# Patient Record
Sex: FEMALE | Race: WHITE | NOT HISPANIC OR LATINO | Employment: STUDENT | URBAN - METROPOLITAN AREA
[De-identification: names, ages, dates, MRNs, and addresses within clinical notes are randomized per-mention and may not be internally consistent; named-entity substitution may affect disease eponyms.]

---

## 2023-09-21 NOTE — PROGRESS NOTES
"    New Patient Office Visit      Date: 2023   Patient Name: Neyda Talamantes  : 2005   MRN: [id number]     Chief Complaint:    Chief Complaint   Patient presents with    Hypoglycemia     TREMORS SOB        History of Present Illness: Neyda Talamantes is a 17 y.o. female who is here today to establish care.      Subjective      HPI:  Patient is a sophomore at [employer].  Presents today to establish care.  She was last established with a PCP 2 to 3 years ago.      Having \"episodes\" over the last two months where she will have sudden onset dizziness with tremors.  Denies any history of similar episodes prior to 2 months ago.  When first starting it was happening around 1-2x per week.have been gradually increasing in frequency, now 3-4 x per week. Episodes initially lasted only few minutes but can now last between 1-3 hours.  Describes symptoms as \" I get really dizzy and hot and then tremors will start.\"  Tremors are only in the hands.  Initially the episodes seemed to be related to meals, noticed around 30 minutes to an hour after eating.  Symptoms are usually relieved with eating a snack but she will feel tired for several minutes 10 hours after an episode.  Tries to carry snacks- something savory usually helps rather than sugar. Has some associated SOB with the episodes.  History of childhood asthma but no issues in recent years.  She has not had any associated syncope.  Some associated nausea but no vomiting.  No family history of type I or type 2 diabetes.  She does have a childhood history of iron deficiency anemia.  States that menstrual cycles are regular bleeding is described as normal not excessive.  Cycles usually last 5 days.  She denies any substance use.  Rare alcohol use once per week or less.  Denies any binge drinking.  States that she does struggle at times with anxiety/depression but the episodes do not seem to be stress-induced.    No other concerns noted today.    Review of " Systems:   Negative/not pertinent unless otherwise noted above in HPI.     Past Medical History:   Past Medical History:   Diagnosis Date    Asthma     Low iron        Past Surgical History:   Past Surgical History:   Procedure Laterality Date    THROAT SURGERY         Family History:   Family History   Problem Relation Age of Onset    Arthritis Mother     Sleep apnea Mother     Hypertension Father     High cholesterol Father     Sleep apnea Father     Breast cancer Maternal Grandmother     Breast cancer Paternal Grandmother        Social History:   Social History     Socioeconomic History    Marital status: Single   Tobacco Use    Smoking status: Never    Smokeless tobacco: Never   Substance and Sexual Activity    Alcohol use: Yes    Drug use: Never    Sexual activity: Defer       Medications:   No current outpatient medications on file.    Allergies:   No Known Allergies    Immunizations:  Immunization History   Administered Date(s) Administered    COVID-19 (PFIZER) Purple Cap Monovalent 04/08/2021, 05/12/2021, 01/14/2022       Tobacco Use: Low Risk     Smoking Tobacco Use: Never    Smokeless Tobacco Use: Never    Passive Exposure: Not on file       Social History     Substance and Sexual Activity   Alcohol Use Yes        Social History     Substance and Sexual Activity   Drug Use Never        Diet/Physical activity: Described as healthy, regular physical activity.  Likes yoga. counseled on 09/22/23    Sexual Health: using contraception, not attempting pregnancy   Menstrual Cycles: regular, last menstrual cycle: unknown    PHQ-2 Depression Screening  Little interest or pleasure in doing things? 1-->several days   Feeling down, depressed, or hopeless? 1-->several days   PHQ-2 Total Score 5     PHQ-9 Total Score: 5     Objective     Physical Exam:  Vital Signs:   Vitals:    09/22/23 0823   BP: 122/78   BP Location: Left arm   Patient Position: Sitting   Cuff Size: Adult   Pulse: 85   SpO2: 98%   Weight: 89.5 kg (197  "lb 6.4 oz)   Height: 167.6 cm (66\")     Body mass index is 31.86 kg/m².    Physical Exam  Constitutional:       Appearance: She is normal weight. She is not ill-appearing.   HENT:      Head: Normocephalic and atraumatic.      Right Ear: Tympanic membrane normal.      Left Ear: Tympanic membrane normal.      Mouth/Throat:      Mouth: Mucous membranes are moist.      Pharynx: Oropharynx is clear. No oropharyngeal exudate or posterior oropharyngeal erythema.   Eyes:      Extraocular Movements: Extraocular movements intact.      Conjunctiva/sclera: Conjunctivae normal.   Cardiovascular:      Rate and Rhythm: Normal rate and regular rhythm.      Heart sounds: Normal heart sounds.   Pulmonary:      Breath sounds: Normal breath sounds. No wheezing or rhonchi.   Abdominal:      General: Abdomen is flat.      Palpations: Abdomen is soft.      Tenderness: There is no abdominal tenderness.   Musculoskeletal:         General: Normal range of motion.      Cervical back: Normal range of motion and neck supple.   Lymphadenopathy:      Cervical: No cervical adenopathy.   Neurological:      General: No focal deficit present.      Mental Status: She is alert.   Psychiatric:         Mood and Affect: Mood normal.         Thought Content: Thought content normal.             Assessment / Plan      Assessment/Plan:   Diagnoses and all orders for this visit:    1. Encounter for medical examination to establish care (Primary)    2. Dizziness  -     POCT Glucose  -     Comprehensive Metabolic Panel; Future  -     CBC & Differential; Future  -     TSH Rfx On Abnormal To Free T4; Future  -     Cancel: Hemoglobin A1c; Future  -     POC Glycosylated Hemoglobin (Hb A1C)    3. Other fatigue  -     Comprehensive Metabolic Panel; Future  -     TSH Rfx On Abnormal To Free T4; Future    4. Occasional tremors  -     POCT Glucose  -     Comprehensive Metabolic Panel; Future  -     TSH Rfx On Abnormal To Free T4; Future  -     Cancel: Hemoglobin A1c; " Future  -     POC Glycosylated Hemoglobin (Hb A1C)      Episodes of Dizziness/Fatigue/Tremors  This is a new problem that has been occurring x2 months.  Differentials considered for episodes include vasovagal presyncope, hypoglycemia, anemia, thyroid imbalance or Psychosomatic.  All possibilities were discussed with patient.  Her POC A1c returned normal at 5.3% but her POC glucose was low at 49.  Patient denied any current symptoms of dizziness, fatigue, tremors during the time of her visit.  Venous draw was ordered for confirmation along with CBC, TSH to rule out other secondary causes.  If her blood work is unrevealing her symptoms seem most consistent with vasovagal episodes, although the low POC glucose is concerning for hypoglycemic events.  She was counseled to avoid fasting and dehydration in the meantime.  Keep a snack on you at all times.  Further work-up to be determined once her lab results are back.    -Address anxiety/depressive symptoms if still occurring at follow-up    Healthcare Maintenance:  Counseling provided based on age appropriate USPSTF guidelines.  BMI cannot be calculated due to outdated height or weight values.  Please input a current height/weight in Vitals and re-renter BMIFOLLOWUP in Note to pull in correct documentation based on BMI range.    Neyda Vinita voices understanding and acceptance of this advice and will call back with any further questions or concerns. AVS with preventive healthcare tips printed for patient.         Follow Up:   Return if symptoms worsen or fail to improve.        Mara Tucker DO  [facility] [address]

## 2023-09-22 ENCOUNTER — LAB (OUTPATIENT)
Dept: LAB | Facility: HOSPITAL | Age: 18
End: 2023-09-22
Payer: COMMERCIAL

## 2023-09-22 ENCOUNTER — OFFICE VISIT (OUTPATIENT)
Dept: FAMILY MEDICINE CLINIC | Facility: CLINIC | Age: 18
End: 2023-09-22
Payer: COMMERCIAL

## 2023-09-22 VITALS
OXYGEN SATURATION: 98 % | HEART RATE: 85 BPM | SYSTOLIC BLOOD PRESSURE: 122 MMHG | BODY MASS INDEX: 31.72 KG/M2 | HEIGHT: 66 IN | DIASTOLIC BLOOD PRESSURE: 78 MMHG | WEIGHT: 197.4 LBS

## 2023-09-22 DIAGNOSIS — R42 DIZZINESS: ICD-10-CM

## 2023-09-22 DIAGNOSIS — R53.83 OTHER FATIGUE: ICD-10-CM

## 2023-09-22 DIAGNOSIS — R25.1 OCCASIONAL TREMORS: ICD-10-CM

## 2023-09-22 DIAGNOSIS — Z00.00 ENCOUNTER FOR MEDICAL EXAMINATION TO ESTABLISH CARE: Primary | ICD-10-CM

## 2023-09-22 LAB
ALBUMIN SERPL-MCNC: 4.5 G/DL (ref 3.5–5.2)
ALBUMIN/GLOB SERPL: 1.6 G/DL
ALP SERPL-CCNC: 97 U/L (ref 39–117)
ALT SERPL W P-5'-P-CCNC: 9 U/L (ref 1–33)
ANION GAP SERPL CALCULATED.3IONS-SCNC: 11.4 MMOL/L (ref 5–15)
AST SERPL-CCNC: 13 U/L (ref 1–32)
BASOPHILS # BLD AUTO: 0.05 10*3/MM3 (ref 0–0.2)
BASOPHILS NFR BLD AUTO: 0.6 % (ref 0–1.5)
BILIRUB SERPL-MCNC: 0.3 MG/DL (ref 0–1.2)
BUN SERPL-MCNC: 7 MG/DL (ref 6–20)
BUN/CREAT SERPL: 11.3 (ref 7–25)
CALCIUM SPEC-SCNC: 9.1 MG/DL (ref 8.6–10.5)
CHLORIDE SERPL-SCNC: 105 MMOL/L (ref 98–107)
CO2 SERPL-SCNC: 25.6 MMOL/L (ref 22–29)
CREAT SERPL-MCNC: 0.62 MG/DL (ref 0.57–1)
DEPRECATED RDW RBC AUTO: 38.5 FL (ref 37–54)
EGFRCR SERPLBLD CKD-EPI 2021: 131.7 ML/MIN/1.73
EOSINOPHIL # BLD AUTO: 0.17 10*3/MM3 (ref 0–0.4)
EOSINOPHIL NFR BLD AUTO: 2.1 % (ref 0.3–6.2)
ERYTHROCYTE [DISTWIDTH] IN BLOOD BY AUTOMATED COUNT: 13 % (ref 12.3–15.4)
EXPIRATION DATE: NORMAL
GLOBULIN UR ELPH-MCNC: 2.8 GM/DL
GLUCOSE BLDC GLUCOMTR-MCNC: 49 MG/DL (ref 70–130)
GLUCOSE SERPL-MCNC: 82 MG/DL (ref 65–99)
HBA1C MFR BLD: 5.3 %
HCT VFR BLD AUTO: 40.1 % (ref 34–46.6)
HGB BLD-MCNC: 13.1 G/DL (ref 12–15.9)
IMM GRANULOCYTES # BLD AUTO: 0.02 10*3/MM3 (ref 0–0.05)
IMM GRANULOCYTES NFR BLD AUTO: 0.2 % (ref 0–0.5)
LYMPHOCYTES # BLD AUTO: 2.28 10*3/MM3 (ref 0.7–3.1)
LYMPHOCYTES NFR BLD AUTO: 28.4 % (ref 19.6–45.3)
Lab: NORMAL
MCH RBC QN AUTO: 27 PG (ref 26.6–33)
MCHC RBC AUTO-ENTMCNC: 32.7 G/DL (ref 31.5–35.7)
MCV RBC AUTO: 82.7 FL (ref 79–97)
MONOCYTES # BLD AUTO: 0.57 10*3/MM3 (ref 0.1–0.9)
MONOCYTES NFR BLD AUTO: 7.1 % (ref 5–12)
NEUTROPHILS NFR BLD AUTO: 4.95 10*3/MM3 (ref 1.7–7)
NEUTROPHILS NFR BLD AUTO: 61.6 % (ref 42.7–76)
NRBC BLD AUTO-RTO: 0 /100 WBC (ref 0–0.2)
PLATELET # BLD AUTO: 306 10*3/MM3 (ref 140–450)
PMV BLD AUTO: 11.1 FL (ref 6–12)
POTASSIUM SERPL-SCNC: 3.5 MMOL/L (ref 3.5–5.2)
PROT SERPL-MCNC: 7.3 G/DL (ref 6–8.5)
RBC # BLD AUTO: 4.85 10*6/MM3 (ref 3.77–5.28)
SODIUM SERPL-SCNC: 142 MMOL/L (ref 136–145)
TSH SERPL DL<=0.05 MIU/L-ACNC: 1.52 UIU/ML (ref 0.27–4.2)
WBC NRBC COR # BLD: 8.04 10*3/MM3 (ref 3.4–10.8)

## 2023-09-22 PROCEDURE — 80050 GENERAL HEALTH PANEL: CPT

## 2023-09-28 ENCOUNTER — PATIENT ROUNDING (BHMG ONLY) (OUTPATIENT)
Dept: FAMILY MEDICINE CLINIC | Facility: CLINIC | Age: 18
End: 2023-09-28
Payer: COMMERCIAL

## 2023-11-10 ENCOUNTER — OFFICE VISIT (OUTPATIENT)
Dept: FAMILY MEDICINE CLINIC | Facility: CLINIC | Age: 18
End: 2023-11-10
Payer: COMMERCIAL

## 2023-11-10 VITALS
SYSTOLIC BLOOD PRESSURE: 118 MMHG | HEIGHT: 66 IN | WEIGHT: 201.6 LBS | DIASTOLIC BLOOD PRESSURE: 70 MMHG | OXYGEN SATURATION: 97 % | BODY MASS INDEX: 32.4 KG/M2 | HEART RATE: 75 BPM

## 2023-11-10 DIAGNOSIS — Z30.011 ENCOUNTER FOR ORAL CONTRACEPTION INITIAL PRESCRIPTION: Primary | ICD-10-CM

## 2023-11-10 LAB
B-HCG UR QL: NEGATIVE
EXPIRATION DATE: NORMAL
INTERNAL NEGATIVE CONTROL: NEGATIVE
INTERNAL POSITIVE CONTROL: POSITIVE
Lab: NORMAL

## 2023-11-10 RX ORDER — NORETHINDRONE ACETATE AND ETHINYL ESTRADIOL 1MG-20(21)
1 KIT ORAL DAILY
Qty: 84 TABLET | Refills: 3 | Status: SHIPPED | OUTPATIENT
Start: 2023-11-10 | End: 2024-10-11

## 2023-11-10 NOTE — PROGRESS NOTES
Chief Complaint   Patient presents with    Med Refill     Discuss birth control       HPI:  Neyda Talamantes is a 17 y.o. female who presents today to discuss contraception.     Presents today to discuss birth control options. Recently became sexually active. Menstrual cycles are regular, now closer to every 28 days. Last usually 4 to 5 days, starts off very heavy the first couple of days and then will quickly lighten. Used to have a lot more PMS symptoms (cramping, mood changes) but these have also decreased. No h/o STIs. Denies any tobacco use. No h/o blood clots. No h/o uncontrolled migraines. October 17th [facility].      PE:  Vitals:    11/10/23 0940   BP: 118/70   Pulse: 75   SpO2: 97%      Body mass index is 32.54 kg/m².    Gen Appearance: NAD  HEENT: Normocephalic, EOMI  Lungs: Normal respiratory effort  MSK: Moves all extremities well, normal gait, no peripheral edema  Neuro: No focal deficits    Current Outpatient Medications   Medication Sig Dispense Refill    norethindrone-ethinyl estradiol FE (Loestrin Fe 1/20) 1-20 MG-MCG per tablet Take 1 tablet by mouth Daily for 336 days. 84 tablet 3     No current facility-administered medications for this visit.        A/P:  Diagnoses and all orders for this visit:    1. Encounter for oral contraception initial prescription (Primary)  -     norethindrone-ethinyl estradiol FE (Loestrin Fe 1/20) 1-20 MG-MCG per tablet; Take 1 tablet by mouth Daily for 336 days.  Dispense: 84 tablet; Refill: 3  -     POCT pregnancy, urine    Other orders  -     Fluzone >6 Months ([id number])       Reviewed options for birth control including ALEXA's, LARCs, patches, depo provera injections and natural forms of family planning. Patient would like to trial ALEXA's. No contraindications. Pregnancy test negative in office today. Counseled to use back up form of birth control for the first week. Counseled that ALEXA's do not prevent STI's and would need barrier contraception for prevention. If  experiencing lasting side effects patient will call/RTC.     Return in about 1 year (around 11/10/2024) for Annual.     Dictated Utilizing Dragon Dictation    Please note that portions of this note were completed with a voice recognition program.    Part of this note may be an electronic transcription/translation of spoken language to printed text using the Dragon Dictation System.

## 2024-06-11 ENCOUNTER — OFFICE VISIT (OUTPATIENT)
Dept: FAMILY MEDICINE CLINIC | Facility: CLINIC | Age: 19
End: 2024-06-11
Payer: COMMERCIAL

## 2024-06-11 VITALS
SYSTOLIC BLOOD PRESSURE: 110 MMHG | BODY MASS INDEX: 34.39 KG/M2 | OXYGEN SATURATION: 98 % | HEART RATE: 82 BPM | HEIGHT: 66 IN | DIASTOLIC BLOOD PRESSURE: 80 MMHG | WEIGHT: 214 LBS

## 2024-06-11 DIAGNOSIS — N93.0 PCB (POST COITAL BLEEDING): Primary | ICD-10-CM

## 2024-06-11 PROCEDURE — 81025 URINE PREGNANCY TEST: CPT | Performed by: STUDENT IN AN ORGANIZED HEALTH CARE EDUCATION/TRAINING PROGRAM

## 2024-06-11 PROCEDURE — 99213 OFFICE O/P EST LOW 20 MIN: CPT | Performed by: STUDENT IN AN ORGANIZED HEALTH CARE EDUCATION/TRAINING PROGRAM

## 2024-06-11 RX ORDER — NORETHINDRONE ACETATE AND ETHINYL ESTRADIOL .03; 1.5 MG/1; MG/1
1 TABLET ORAL DAILY
Qty: 84 EACH | Refills: 3 | Status: SHIPPED | OUTPATIENT
Start: 2024-06-11

## 2024-06-11 NOTE — PROGRESS NOTES
Chief Complaint   Patient presents with    Contraception       HPI:  Neyda Talamantes is a 18 y.o. female who presents today for abnormal uterine bleeding.     Pt was put on Lo-estrin OCPs a few months ago. Was doing well on therapy but over the last couple of months has had some post coital bleeding. Only sees her SO 1-2 times per month. Will typically have 3-4 days of light spotting afterwards. Denies any pain, abnornal discharge, vaginal irritation or dysuria. Has had same sexual partner and she has been his only sexual partner. No h/o STIs. Has not missed any OCP doses. She has had heavier periods although they are shorter in duration. She does get occasional headaches around time of her menses.     PE:  Vitals:    06/11/24 1114   BP: 110/80   Pulse: 82   SpO2: 98%      Body mass index is 34.54 kg/m².    Gen Appearance: NAD  HEENT: Normocephalic, EOMI  Lungs: Normal WOB  MSK: Moves all extremities well, normal gait, no peripheral edema  Neuro: No focal deficits    Current Outpatient Medications   Medication Sig Dispense Refill    Norethindrone Acet-Ethinyl Est 1.5-30 MG-MCG tablet Take 1 tablet by mouth Daily. 84 each 3     No current facility-administered medications for this visit.        A/P:  Diagnoses and all orders for this visit:    1. PCB (post coital bleeding) (Primary)  -     POC Pregnancy, Urine    Other orders  -     Norethindrone Acet-Ethinyl Est 1.5-30 MG-MCG tablet; Take 1 tablet by mouth Daily.  Dispense: 84 each; Refill: 3       UPT negative in office. Low risk for STI, testing deferred.   Likely related to OCP use- will trial slightly higher dose  FU if symptoms do not resolve or if experiencing adverse effects    Dictated Utilizing Dragon Dictation    Please note that portions of this note were completed with a voice recognition program.    Part of this note may be an electronic transcription/translation of spoken language to printed text using the Dragon Dictation System.

## 2024-06-28 ENCOUNTER — PATIENT MESSAGE (OUTPATIENT)
Dept: FAMILY MEDICINE CLINIC | Facility: CLINIC | Age: 19
End: 2024-06-28
Payer: COMMERCIAL

## 2024-06-28 DIAGNOSIS — Z30.011 ENCOUNTER FOR ORAL CONTRACEPTION INITIAL PRESCRIPTION: Primary | ICD-10-CM

## 2024-07-03 RX ORDER — NORGESTIMATE AND ETHINYL ESTRADIOL 7DAYSX3 28
1 KIT ORAL DAILY
Qty: 28 TABLET | Refills: 12 | Status: SHIPPED | OUTPATIENT
Start: 2024-07-03 | End: 2025-07-03

## 2024-07-03 NOTE — TELEPHONE ENCOUNTER
From: Neyda Talamantes  To: Mara Tucker  Sent: 6/28/2024 11:16 AM EDT  Subject: Symptoms From Changed Birth Control Dosage    Fer Terry! I’ve been having some odd instances of numbness and tingling since starting the new birth control. It’s always on my left side and is mostly concentrated to my hand, arm, and lips. Since my pack is almost done for this month, I’ll be starting my period tomorrow, but I would like to know what to do afterwards. Should I resume the other pack of the new prescription, or should I go back to using the remaining pack in my old prescription? Thanks!

## 2024-11-11 ENCOUNTER — OFFICE VISIT (OUTPATIENT)
Dept: FAMILY MEDICINE CLINIC | Facility: CLINIC | Age: 19
End: 2024-11-11
Payer: COMMERCIAL

## 2024-11-11 VITALS
DIASTOLIC BLOOD PRESSURE: 70 MMHG | HEART RATE: 104 BPM | WEIGHT: 219 LBS | BODY MASS INDEX: 35.2 KG/M2 | SYSTOLIC BLOOD PRESSURE: 122 MMHG | OXYGEN SATURATION: 99 % | HEIGHT: 66 IN

## 2024-11-11 DIAGNOSIS — M79.605 BILATERAL LEG AND FOOT PAIN: ICD-10-CM

## 2024-11-11 DIAGNOSIS — M79.671 BILATERAL LEG AND FOOT PAIN: ICD-10-CM

## 2024-11-11 DIAGNOSIS — Z00.00 ANNUAL PHYSICAL EXAM: Primary | ICD-10-CM

## 2024-11-11 DIAGNOSIS — M79.604 BILATERAL LEG AND FOOT PAIN: ICD-10-CM

## 2024-11-11 DIAGNOSIS — Z12.4 SCREENING FOR MALIGNANT NEOPLASM OF CERVIX: ICD-10-CM

## 2024-11-11 DIAGNOSIS — M79.672 BILATERAL LEG AND FOOT PAIN: ICD-10-CM

## 2024-11-11 DIAGNOSIS — Z30.09 GENERAL COUNSELING AND ADVICE ON CONTRACEPTIVE MANAGEMENT: ICD-10-CM

## 2024-11-11 PROCEDURE — 99395 PREV VISIT EST AGE 18-39: CPT | Performed by: STUDENT IN AN ORGANIZED HEALTH CARE EDUCATION/TRAINING PROGRAM

## 2024-11-11 NOTE — PROGRESS NOTES
Female Physical Note      Date: 2024   Patient Name: Neyda Talamantes  : 2005   MRN: [id number]     Chief Complaint:    Chief Complaint   Patient presents with    Annual Exam    Contraception     Patient wants to discuss long term birth control        History of Present Illness: Neyda Talamantes is a 18 y.o. female who is here today for their annual health maintenance and physical.    HPI  Doing well since last visit.  Would like to discuss contraceptive options.  Currently on triphasic OCPs.  We had switched around her OCPs a couple of times due to irregular bleeding.  Has done best on the triphasic formulation.  Would like to consider longer-term contraceptive option.  Has looked into Nexplanon and IUD options, would like to discuss further.  Having occasional migraines at end of cycle, these are manageable with OTC medications.     Occasionally her feet and legs will be sore at the end of the workday.  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  Wears Chance and other supportive shoes most of the time.    Getting ready to start her last semester of school.  Diet is described as healthy.  Eats fruits and vegetables every day.  Would like to exercise more.  Does get cardio in with walking to classes during school week.  Would like to incorporate more weightbearing activity.    Subjective      Review of Systems:   Review of Systems   Eyes:  Negative for visual disturbance.   Genitourinary:  Negative for menstrual problem.   Musculoskeletal:  Positive for arthralgias.   Neurological:  Positive for headache.       Past Medical History, Social History, Family History and Care Team were all reviewed with patient and updated as appropriate.     Medications:     Current Outpatient Medications:     norgestimate-ethinyl estradiol (ORTHO TRI-CYCLEN,TRINESSA) 0.18/0.215/0.25 MG-35 MCG per tablet, Take 1 tablet by mouth Daily., Disp: 28 tablet, Rfl: 12    Allergies:   No Known  "Allergies    Immunizations:  Td/Tdap(Booster Q 10 yrs): UTD 7/27/2015  Flu (Yearly):  Planning to get through [facility]  Immunization History   Administered Date(s) Administered    COVID-19 (PFIZER) Purple Cap Monovalent 04/08/2021, 05/12/2021, 01/14/2022    DTaP, Unspecified 02/24/2004, 04/22/2004, 06/21/2004, 06/23/2005, 04/17/2008    FluMist 2-49yrs 11/14/2014    Fluzone (or Fluarix & Flulaval for VFC) >6mos 11/10/2023    Hep A, 2 Dose 03/05/2018, 09/17/2018    Hep B, Adolescent or Pediatric 2005, 02/24/2004, 06/21/2004    Hib (PRP-T) 02/24/2004, 04/22/2004, 06/21/2004, 06/23/2005    IPV 02/24/2004, 04/22/2004, 06/21/2004, 04/17/2008    MMR 12/21/2004, 04/17/2008    Meningococcal B,(Bexsero) 07/29/2021    Meningococcal Conjugate 07/29/2015, 07/03/2020    PEDS-Pneumococcal Conjugate (PCV7) 02/24/2004, 04/24/2004, 09/30/2004, 12/21/2004    Tdap 07/27/2015    Varicella 12/21/2004, 07/29/2015     Ophthalmologist/optometrist: UTD  Dentist: UTD    Tobacco Use: Low Risk  (11/11/2024)    Patient History     Smoking Tobacco Use: Never     Smokeless Tobacco Use: Never     Passive Exposure: Not on file       Social History     Substance and Sexual Activity   Alcohol Use Yes    Alcohol/week: 3.0 standard drinks of alcohol    Types: 3 Glasses of wine per week        Social History     Substance and Sexual Activity   Drug Use Never       Objective     Physical Exam:  Vital Signs:   Vitals:    11/11/24 0836   BP: 122/70   BP Location: Left arm   Patient Position: Sitting   Cuff Size: Adult   Pulse: 104   SpO2: 99%   Weight: 99.3 kg (219 lb)   Height: 167.6 cm (66\")     Body mass index is 35.35 kg/m².     Physical Exam  Constitutional:       Appearance: She is normal weight. She is not ill-appearing.   HENT:      Head: Normocephalic and atraumatic.      Right Ear: Tympanic membrane normal.      Left Ear: Tympanic membrane normal.      Mouth/Throat:      Mouth: Mucous membranes are moist.      Pharynx: Oropharynx is clear. No " oropharyngeal exudate or posterior oropharyngeal erythema.   Eyes:      Extraocular Movements: Extraocular movements intact.      Conjunctiva/sclera: Conjunctivae normal.   Cardiovascular:      Rate and Rhythm: Normal rate and regular rhythm.      Heart sounds: Normal heart sounds.   Pulmonary:      Breath sounds: Normal breath sounds. No wheezing or rhonchi.   Abdominal:      General: Abdomen is flat.      Palpations: Abdomen is soft.      Tenderness: There is no abdominal tenderness.   Musculoskeletal:         General: Normal range of motion.      Cervical back: Normal range of motion and neck supple.   Lymphadenopathy:      Cervical: No cervical adenopathy.   Neurological:      General: No focal deficit present.      Mental Status: She is alert.   Psychiatric:         Mood and Affect: Mood normal.         Thought Content: Thought content normal.             Assessment / Plan      Assessment/Plan:   Diagnoses and all orders for this visit:    1. Annual physical exam (Primary)  Age-appropriate screenings recommendations reviewed  Encouraged healthy diet, regular physical activity.  Pap will be due next month, referral to OB/GYN placed for screening as well as contraception discussion  Immunizations were reviewed.  Declines flu shot today.  Has never had Gardasil vaccine.  We briefly discussed this today, patient declines for now.  States she will discuss further with OB/GYN.  Tdap UTD    2. Screening for malignant neoplasm of cervix  -     Ambulatory Referral to Obstetrics / Gynecology    3. General counseling and advice on contraceptive management  -     Ambulatory Referral to Obstetrics / Gynecology    4. Bilateral leg and foot pain  Likely related to prolonged standing/overuse.  Encouraged use of supportive shoes.  May purchase OTC shoe inserts to see if this helps.  If pain does not resolve with this we will plan for podiatry versus Ortho foot referral.       Healthcare Maintenance:  Counseling provided based  on age appropriate USPSTF guidelines. Preventive counseling and anticipatory guidance discussed on the following topics: nutrition, physical activity, healthy weight, family planning/contraception, dental health, and immunizations    BMI is >= 30 and <35. (Class 1 Obesity). The following options were offered after discussion;: exercise counseling/recommendations and nutrition counseling/recommendations    Neyda Talamantes voices understanding and acceptance of this advice and will call back with any further questions or concerns. AVS with preventive healthcare tips printed for patient.     “Discussed risks/benefits to vaccination, reviewed components of the vaccine, discussed VIS, discussed informed consent, informed consent obtained. Patient/Parent was allowed to accept or refuse vaccine. Questions answered to satisfactory state of patient/Parent. We reviewed typical age appropriate and seasonally appropriate vaccinations. Reviewed immunization history and updated state vaccination form as needed. Patient was counseled on HPV vaccine.    Follow Up:   Return in about 1 year (around 11/11/2025) for Annual.          Mara Tucker DO  [facility] [address]

## 2024-12-03 ENCOUNTER — PATIENT MESSAGE (OUTPATIENT)
Dept: FAMILY MEDICINE CLINIC | Facility: CLINIC | Age: 19
End: 2024-12-03
Payer: COMMERCIAL

## 2024-12-03 DIAGNOSIS — Z30.011 ENCOUNTER FOR ORAL CONTRACEPTION INITIAL PRESCRIPTION: ICD-10-CM

## 2024-12-03 RX ORDER — NORGESTIMATE AND ETHINYL ESTRADIOL 7DAYSX3 28
1 KIT ORAL DAILY
Qty: 84 TABLET | Refills: 3 | Status: SHIPPED | OUTPATIENT
Start: 2024-12-03 | End: 2025-12-03

## 2025-02-18 ENCOUNTER — OFFICE VISIT (OUTPATIENT)
Dept: OBSTETRICS AND GYNECOLOGY | Facility: CLINIC | Age: 20
End: 2025-02-18
Payer: COMMERCIAL

## 2025-02-18 VITALS
SYSTOLIC BLOOD PRESSURE: 116 MMHG | WEIGHT: 220.8 LBS | HEIGHT: 66 IN | DIASTOLIC BLOOD PRESSURE: 74 MMHG | BODY MASS INDEX: 35.48 KG/M2

## 2025-02-18 DIAGNOSIS — Z01.419 WELL WOMAN EXAM WITH ROUTINE GYNECOLOGICAL EXAM: Primary | ICD-10-CM

## 2025-02-18 NOTE — PROGRESS NOTES
"Subjective   Chief Complaint   Patient presents with    [facility] Mike a couple weeks ago and wants to talk about it/second opinion     Neyda Talamantes is a 19 y.o. year old  presenting to be seen for her annual exam. She is concerned about recent IUD insertion. She had Liletta IUD inserted at  2 weeks ago and has had about 11 days of intermittent bleeding since then. Some cramping, and reports that she was due to start her period in that time as well. She is wondering if it is in place still. She is also wondering what to expect with IUD long term. She would like to get her Pap smear and annual exam done today.         SEXUAL Hx:  She is currently sexually active. Condoms are used intermittently.  She would like to be screened for STD's at today's exam.  She is not having pain with intercourse.   Current birth control method: IUD - Liletta.  She is happy with her current method of contraception.     MENSTRUAL Hx:  Patient's last menstrual period was 2025 (exact date).  In the past 6 months her cycles have been regular, predictable and occur monthly.  Her menstrual flow is typically normal.   Each month on average there are roughly 3 day(s) of very heavy flow.  Intermenstrual bleeding is absent.    Post-coital bleeding is absent.  Dysmenorrhea: is not affecting her activities of daily living  PMS: is not affecting her activities of daily living       Objective   /74   Ht 167.6 cm (65.98\")   Wt 100 kg (220 lb 12.8 oz)   LMP 2025 (Exact Date)   BMI 35.66 kg/m²     General:  well developed; well nourished  no acute distress  mentation appropriate   Skin:  No suspicious lesions seen   Thyroid: not examined   Breasts:  Not performed.   Abdomen: soft, non-tender; no masses   Pelvis: Clinical staff was present for exam  Normal external female genitalia. Mild vaginismus with insertion of speculum. Normal appearing cervix. IUD strings visualized, small amount of dark red blood in " vaginal vault. Uterus small, ante verted, no pain with palpation. No palpable adnexal masses.         Assessment      Plan   Well woman exam    - Contraception: Liletta IUD placed 1/2025    - Pap: collected today    - Mammogram: due age 38   - STI screening collected today    - Patient has PCP     IUD discussion   - IUD appears to be in place. Discussed expected bleeding -- can have intermittent spotting and bleeding in first month after placement, but should continue to decrease. By about 3-6 months, should have significant reduction in periods and will be good x  5 years.   - If patient is unhappy with IUD, can remove at any time, but I would recommend trying it for at least 3-6 months to determine if she likes bleeding pattern at that point.       No orders of the defined types were placed in this encounter.     Follow up in 1 year for annual exam.     This note was electronically signed.    Roxie Ortega MD  Obstetrics and Gynecology  [facility]

## 2025-02-19 LAB — REF LAB TEST METHOD: NORMAL

## 2025-02-20 ENCOUNTER — PATIENT ROUNDING (BHMG ONLY) (OUTPATIENT)
Dept: OBSTETRICS AND GYNECOLOGY | Facility: CLINIC | Age: 20
End: 2025-02-20
Payer: COMMERCIAL

## 2025-02-20 NOTE — PROGRESS NOTES
My name is NandaFAUSTINO    I am with YUAN ALVAREZ  [facility]  [address]  [place] [address] [phone]    I want to officially welcome you to our practice and ask about your recent visit.    Tell me about your visit with us.  What things went well?    We're always looking for ways to make our patients' experiences even better.  Do you have recommendations on ways we may improve?    Overall were you satisfied with your first visit to our practice?    I appreciate you taking the time to answer a few questions today.  Is there anything else I can do for you?    Thank you, and have a great day.